# Patient Record
Sex: FEMALE | URBAN - METROPOLITAN AREA
[De-identification: names, ages, dates, MRNs, and addresses within clinical notes are randomized per-mention and may not be internally consistent; named-entity substitution may affect disease eponyms.]

---

## 2024-05-22 NOTE — PROGRESS NOTES
"Gastroenterology Office Visit     History of Present Illness:   Jessica Samayoa is a 39 y.o. female who presents to GI clinic for ***.      Last colonoscopy:  Last endosopy:    No history of abdominal surgeries    Review of Systems  Review of Systems    Past Medical History   has no past medical history on file.     Past Surgical History  No past surgical history on file.    Social History        Family History  family history is not on file.   No family history of stomach or colon cancer    Allergies  Not on File    Medications  No current outpatient medications     Objective   There were no vitals taken for this visit.     Physical Exam    LABS  Pertinent labs were reviewed with the patient  No results found for: \"WBC\", \"HGB\", \"HCT\", \"PLT\"   No results found for: \"NA\", \"K\", \"CL\", \"CO2\", \"BUN\", \"CREATININE\", \"GLUCOSE\", \"CALCIUM\"   No results found for: \"ALKPHOS\", \"BILITOT\", \"BILIDIR\", \"PROT\", \"ALT\", \"AST\"   No results found for: \"INR\"   No results found for: \"IRON\", \"TIBC\"   No results found for: \"FERRITIN\"   No results found for: \"TSH\", \"T3FREE\", \"FREET4\"     Radiology  Pertinent radiology was reviewed with the patient  ***    Endoscopy  Colonoscopy ***    EGD ***    Assessment/Plan   Jessica Samayoa is a 39 y.o. female who presents to GI clinic for ***.    No problem-specific Assessment & Plan notes found for this encounter.      {There are no diagnoses linked to this encounter. (Refresh or delete this SmartLink)}     ***refresh   Kelle Sanford PA-C     "

## 2024-06-04 ENCOUNTER — APPOINTMENT (OUTPATIENT)
Dept: GASTROENTEROLOGY | Facility: CLINIC | Age: 39
End: 2024-06-04